# Patient Record
Sex: FEMALE | HISPANIC OR LATINO | Employment: UNEMPLOYED | URBAN - METROPOLITAN AREA
[De-identification: names, ages, dates, MRNs, and addresses within clinical notes are randomized per-mention and may not be internally consistent; named-entity substitution may affect disease eponyms.]

---

## 2022-12-11 ENCOUNTER — OFFICE VISIT (OUTPATIENT)
Dept: URGENT CARE | Facility: URGENT CARE | Age: 27
End: 2022-12-11
Payer: COMMERCIAL

## 2022-12-11 VITALS
HEIGHT: 66 IN | HEART RATE: 101 BPM | WEIGHT: 230 LBS | BODY MASS INDEX: 36.96 KG/M2 | DIASTOLIC BLOOD PRESSURE: 63 MMHG | OXYGEN SATURATION: 98 % | TEMPERATURE: 97.9 F | SYSTOLIC BLOOD PRESSURE: 115 MMHG

## 2022-12-11 DIAGNOSIS — S05.02XA ABRASION OF LEFT CORNEA, INITIAL ENCOUNTER: Primary | ICD-10-CM

## 2022-12-11 PROCEDURE — 99203 OFFICE O/P NEW LOW 30 MIN: CPT | Performed by: PHYSICIAN ASSISTANT

## 2022-12-11 RX ORDER — IBUPROFEN 200 MG
200 TABLET ORAL EVERY 4 HOURS PRN
COMMUNITY

## 2022-12-11 RX ORDER — ERYTHROMYCIN 5 MG/G
0.5 OINTMENT OPHTHALMIC 4 TIMES DAILY
Qty: 10 G | Refills: 0 | Status: SHIPPED | OUTPATIENT
Start: 2022-12-11 | End: 2022-12-16

## 2022-12-11 NOTE — PROGRESS NOTES
Assessment & Plan     1. Abrasion of left cornea, initial encounter  Abrasion noted at 2 o clock position  Erythromycin ointment as prescribed  Should see improvement in 24 hours, if symptoms are not improving as expected, follow-up with Ophthalmology / Optometry   - erythromycin (ROMYCIN) 5 MG/GM ophthalmic ointment; Place 0.5 inches Into the left eye 4 times daily for 5 days  Dispense: 10 g; Refill: 0        Return in about 1 day (around 12/12/2022), or if symptoms worsen or fail to improve, for Optometry / Ophthamology.    Diagnosis and treatment plan was reviewed with patient and/or family.   We went over any labs or imaging. Discussed worsening symptoms or little to no relief despite treatment plan to follow-up with PCP or return to clinic.  Patient verbalizes understanding. All questions were addressed and answered.     Richelle Paul PA-C  [facility] [place]    CHIEF COMPLAINT:   Chief Complaint   Patient presents with     Urgent Care     Eye Problem     Pt in clinic to have eval for left eye irritation, redness and pain.     Subjective     Pretty is a 27 year old female who presents to clinic today for evaluation left eye irritation and redness.  Patient first noted the symptoms 2 days ago, she close the curtain, and felt something on her eye. Feels a foreign body in eye. No change in vision.   Patient denies eyelid swelling, eye drainage, change of vision, photophobia, periorbital erythema or headache.       No past medical history on file.  No past surgical history on file.  Social History     Tobacco Use     Smoking status: Never     Smokeless tobacco: Never   Substance Use Topics     Alcohol use: Not on file     Current Outpatient Medications   Medication     erythromycin (ROMYCIN) 5 MG/GM ophthalmic ointment     ibuprofen (ADVIL/MOTRIN) 200 MG tablet     No current facility-administered medications for this visit.     No Known Allergies    10 point ROS of systems were  "all negative except for pertinent positives noted in my HPI.      Exam:   /63   Pulse 101   Temp 97.9  F (36.6  C) (Temporal)   Ht 1.676 m (5' 6\")   Wt 104.3 kg (230 lb)   LMP 12/08/2022   SpO2 98%   BMI 37.12 kg/m    Constitutional: healthy, alert and no distress  Head: Normocephalic, atraumatic.  Eyes: conjunctiva clear, no drainage. Fluorescein uptake in upper lid of right eye. EOMI. PERRL  ENT: Throat clear  Skin: no rashes  Neurologic: Speech clear, gait normal. Moves all extremities.    No results found for any visits on 12/11/22.        "

## 2022-12-11 NOTE — PATIENT INSTRUCTIONS
Start using Romycin ophthalmic ointment four times per day  You should see improvement in 24 hours  If you have worsening symptoms, or your symptoms do not improve in 1 days, you need to follow-up with Ophthalmology / Optometry